# Patient Record
Sex: MALE | Race: WHITE | HISPANIC OR LATINO | URBAN - METROPOLITAN AREA
[De-identification: names, ages, dates, MRNs, and addresses within clinical notes are randomized per-mention and may not be internally consistent; named-entity substitution may affect disease eponyms.]

---

## 2023-09-11 ENCOUNTER — EMERGENCY (EMERGENCY)
Facility: HOSPITAL | Age: 73
LOS: 0 days | Discharge: HOME | End: 2023-09-11
Attending: EMERGENCY MEDICINE
Payer: SELF-PAY

## 2023-09-11 VITALS
DIASTOLIC BLOOD PRESSURE: 87 MMHG | OXYGEN SATURATION: 100 % | TEMPERATURE: 98 F | HEART RATE: 64 BPM | SYSTOLIC BLOOD PRESSURE: 162 MMHG | RESPIRATION RATE: 17 BRPM

## 2023-09-11 VITALS — WEIGHT: 119.93 LBS | HEIGHT: 64 IN

## 2023-09-11 DIAGNOSIS — R07.89 OTHER CHEST PAIN: ICD-10-CM
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
## 2023-09-11 DIAGNOSIS — M54.9 DORSALGIA, UNSPECIFIED: ICD-10-CM

## 2023-09-11 DIAGNOSIS — R10.9 UNSPECIFIED ABDOMINAL PAIN: ICD-10-CM
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
## 2023-09-11 DIAGNOSIS — M54.2 CERVICALGIA: ICD-10-CM

## 2023-09-11 DIAGNOSIS — S42.021A DISPLACED FRACTURE OF SHAFT OF RIGHT CLAVICLE, INITIAL ENCOUNTER FOR CLOSED FRACTURE: ICD-10-CM

## 2023-09-11 DIAGNOSIS — I45.10 UNSPECIFIED RIGHT BUNDLE-BRANCH BLOCK: ICD-10-CM

## 2023-09-11 DIAGNOSIS — R63.0 ANOREXIA: ICD-10-CM

## 2023-09-11 LAB
ALBUMIN SERPL ELPH-MCNC: 3.5 G/DL — SIGNIFICANT CHANGE UP (ref 3.3–5)
ALP SERPL-CCNC: 96 U/L — SIGNIFICANT CHANGE UP (ref 40–120)
ALT FLD-CCNC: 33 U/L — SIGNIFICANT CHANGE UP (ref 12–78)
ANION GAP SERPL CALC-SCNC: 1 MMOL/L — LOW (ref 5–17)
APPEARANCE UR: ABNORMAL
AST SERPL-CCNC: 14 U/L — LOW (ref 15–37)
BACTERIA # UR AUTO: NEGATIVE — SIGNIFICANT CHANGE UP
BASOPHILS # BLD AUTO: 0.03 K/UL — SIGNIFICANT CHANGE UP (ref 0–0.2)
BASOPHILS NFR BLD AUTO: 0.4 % — SIGNIFICANT CHANGE UP (ref 0–2)
BILIRUB SERPL-MCNC: 0.5 MG/DL — SIGNIFICANT CHANGE UP (ref 0.2–1.2)
BILIRUB UR-MCNC: NEGATIVE — SIGNIFICANT CHANGE UP
BUN SERPL-MCNC: 11 MG/DL — SIGNIFICANT CHANGE UP (ref 7–23)
CALCIUM SERPL-MCNC: 10.7 MG/DL — HIGH (ref 8.5–10.1)
CHLORIDE SERPL-SCNC: 104 MMOL/L — SIGNIFICANT CHANGE UP (ref 96–108)
CO2 SERPL-SCNC: 33 MMOL/L — HIGH (ref 22–31)
COLOR SPEC: YELLOW — SIGNIFICANT CHANGE UP
COMMENT - URINE: SIGNIFICANT CHANGE UP
CREAT SERPL-MCNC: 0.93 MG/DL — SIGNIFICANT CHANGE UP (ref 0.5–1.3)
DIFF PNL FLD: NEGATIVE — SIGNIFICANT CHANGE UP
EGFR: 88 ML/MIN/1.73M2 — SIGNIFICANT CHANGE UP
EOSINOPHIL # BLD AUTO: 0.13 K/UL — SIGNIFICANT CHANGE UP (ref 0–0.5)
EOSINOPHIL NFR BLD AUTO: 1.9 % — SIGNIFICANT CHANGE UP (ref 0–6)
EPI CELLS # UR: NEGATIVE — SIGNIFICANT CHANGE UP
GLUCOSE SERPL-MCNC: 125 MG/DL — HIGH (ref 70–99)
GLUCOSE UR QL: NEGATIVE — SIGNIFICANT CHANGE UP
HCT VFR BLD CALC: 42.7 % — SIGNIFICANT CHANGE UP (ref 39–50)
HGB BLD-MCNC: 14.4 G/DL — SIGNIFICANT CHANGE UP (ref 13–17)
IMM GRANULOCYTES NFR BLD AUTO: 0.1 % — SIGNIFICANT CHANGE UP (ref 0–0.9)
KETONES UR-MCNC: NEGATIVE — SIGNIFICANT CHANGE UP
LEUKOCYTE ESTERASE UR-ACNC: NEGATIVE — SIGNIFICANT CHANGE UP
LIDOCAIN IGE QN: 19 U/L — SIGNIFICANT CHANGE UP (ref 13–75)
LYMPHOCYTES # BLD AUTO: 1.43 K/UL — SIGNIFICANT CHANGE UP (ref 1–3.3)
LYMPHOCYTES # BLD AUTO: 21.3 % — SIGNIFICANT CHANGE UP (ref 13–44)
MCHC RBC-ENTMCNC: 29.8 PG — SIGNIFICANT CHANGE UP (ref 27–34)
MCHC RBC-ENTMCNC: 33.7 GM/DL — SIGNIFICANT CHANGE UP (ref 32–36)
MCV RBC AUTO: 88.4 FL — SIGNIFICANT CHANGE UP (ref 80–100)
MONOCYTES # BLD AUTO: 0.48 K/UL — SIGNIFICANT CHANGE UP (ref 0–0.9)
MONOCYTES NFR BLD AUTO: 7.2 % — SIGNIFICANT CHANGE UP (ref 2–14)
NEUTROPHILS # BLD AUTO: 4.62 K/UL — SIGNIFICANT CHANGE UP (ref 1.8–7.4)
NEUTROPHILS NFR BLD AUTO: 69.1 % — SIGNIFICANT CHANGE UP (ref 43–77)
NITRITE UR-MCNC: NEGATIVE — SIGNIFICANT CHANGE UP
PH UR: 8 — SIGNIFICANT CHANGE UP (ref 5–8)
PLATELET # BLD AUTO: 357 K/UL — SIGNIFICANT CHANGE UP (ref 150–400)
POTASSIUM SERPL-MCNC: 3.7 MMOL/L — SIGNIFICANT CHANGE UP (ref 3.5–5.3)
POTASSIUM SERPL-SCNC: 3.7 MMOL/L — SIGNIFICANT CHANGE UP (ref 3.5–5.3)
PROT SERPL-MCNC: 7 GM/DL — SIGNIFICANT CHANGE UP (ref 6–8.3)
PROT UR-MCNC: NEGATIVE — SIGNIFICANT CHANGE UP
RBC # BLD: 4.83 M/UL — SIGNIFICANT CHANGE UP (ref 4.2–5.8)
RBC # FLD: 12.9 % — SIGNIFICANT CHANGE UP (ref 10.3–14.5)
RBC CASTS # UR COMP ASSIST: NEGATIVE /HPF — SIGNIFICANT CHANGE UP (ref 0–4)
SODIUM SERPL-SCNC: 138 MMOL/L — SIGNIFICANT CHANGE UP (ref 135–145)
SP GR SPEC: 1.01 — SIGNIFICANT CHANGE UP (ref 1.01–1.02)
TROPONIN I, HIGH SENSITIVITY RESULT: 6.62 NG/L — SIGNIFICANT CHANGE UP
UROBILINOGEN FLD QL: NEGATIVE — SIGNIFICANT CHANGE UP
WBC # BLD: 6.7 K/UL — SIGNIFICANT CHANGE UP (ref 3.8–10.5)
WBC # FLD AUTO: 6.7 K/UL — SIGNIFICANT CHANGE UP (ref 3.8–10.5)
WBC UR QL: NEGATIVE /HPF — SIGNIFICANT CHANGE UP (ref 0–5)

## 2023-09-11 PROCEDURE — 80053 COMPREHEN METABOLIC PANEL: CPT

## 2023-09-11 PROCEDURE — 93005 ELECTROCARDIOGRAM TRACING: CPT

## 2023-09-11 PROCEDURE — 74177 CT ABD & PELVIS W/CONTRAST: CPT | Mod: MA

## 2023-09-11 PROCEDURE — 96374 THER/PROPH/DIAG INJ IV PUSH: CPT | Mod: XU

## 2023-09-11 PROCEDURE — 87086 URINE CULTURE/COLONY COUNT: CPT

## 2023-09-11 PROCEDURE — 84484 ASSAY OF TROPONIN QUANT: CPT

## 2023-09-11 PROCEDURE — 73030 X-RAY EXAM OF SHOULDER: CPT | Mod: RT

## 2023-09-11 PROCEDURE — 74177 CT ABD & PELVIS W/CONTRAST: CPT | Mod: 26,MA

## 2023-09-11 PROCEDURE — 73030 X-RAY EXAM OF SHOULDER: CPT | Mod: 26,RT

## 2023-09-11 PROCEDURE — 83690 ASSAY OF LIPASE: CPT

## 2023-09-11 PROCEDURE — 99285 EMERGENCY DEPT VISIT HI MDM: CPT | Mod: 25

## 2023-09-11 PROCEDURE — 93010 ELECTROCARDIOGRAM REPORT: CPT

## 2023-09-11 PROCEDURE — 36415 COLL VENOUS BLD VENIPUNCTURE: CPT

## 2023-09-11 PROCEDURE — 99285 EMERGENCY DEPT VISIT HI MDM: CPT

## 2023-09-11 PROCEDURE — 23500 CLTX CLAVICULAR FX W/O MNPJ: CPT | Mod: RT

## 2023-09-11 PROCEDURE — 71260 CT THORAX DX C+: CPT | Mod: MA

## 2023-09-11 PROCEDURE — 85025 COMPLETE CBC W/AUTO DIFF WBC: CPT

## 2023-09-11 PROCEDURE — 73000 X-RAY EXAM OF COLLAR BONE: CPT | Mod: 26,RT

## 2023-09-11 PROCEDURE — 73000 X-RAY EXAM OF COLLAR BONE: CPT | Mod: RT

## 2023-09-11 PROCEDURE — 81001 URINALYSIS AUTO W/SCOPE: CPT

## 2023-09-11 PROCEDURE — 71260 CT THORAX DX C+: CPT | Mod: 26,MA

## 2023-09-11 RX ORDER — LIDOCAINE 4 G/100G
10 CREAM TOPICAL ONCE
Refills: 0 | Status: COMPLETED | OUTPATIENT
Start: 2023-09-11 | End: 2023-09-11

## 2023-09-11 RX ORDER — FAMOTIDINE 10 MG/ML
20 INJECTION INTRAVENOUS ONCE
Refills: 0 | Status: COMPLETED | OUTPATIENT
Start: 2023-09-11 | End: 2023-09-11

## 2023-09-11 RX ADMIN — Medication 30 MILLILITER(S): at 13:13

## 2023-09-11 RX ADMIN — FAMOTIDINE 20 MILLIGRAM(S): 10 INJECTION INTRAVENOUS at 13:13

## 2023-09-11 RX ADMIN — LIDOCAINE 10 MILLILITER(S): 4 CREAM TOPICAL at 13:37

## 2023-09-11 NOTE — CONSULT NOTE ADULT - SUBJECTIVE AND OBJECTIVE BOX
73y Male RHD presents c/o R shoulder pain s/p mechanical fall. MF down several stairs 1 month ago. Patient was not at work. Patient works as day . Patient had concern about obtaining insurance and did not present to doctor's office because of that. Denies numbness, tingling paresthesias in affected extremity. Able to ambulate after fall. Patient endorses not being able to move the shoulder significantly since the injury. Patient has not been able to work since the injury.    PAST MEDICAL & SURGICAL HISTORY:    MEDICATIONS  (STANDING):    Allergies    No Known Allergies    Intolerances                            14.4   6.70  )-----------( 357      ( 11 Sep 2023 09:56 )             42.7     11 Sep 2023 09:56    138    |  104    |  11     ----------------------------<  125    3.7     |  33     |  0.93     Ca    10.7       11 Sep 2023 09:56    TPro  7.0    /  Alb  3.5    /  TBili  0.5    /  DBili  x      /  AST  14     /  ALT  33     /  AlkPhos  96     11 Sep 2023 09:56        Imaging: XR personally reviewed and demonstrates R midshaft Clavicle Fracture, significant displacement    Vital Signs Last 24 Hrs  T(C): 36.7 (09-11-23 @ 09:52), Max: 36.7 (09-11-23 @ 09:52)  T(F): 98.1 (09-11-23 @ 09:52), Max: 98.1 (09-11-23 @ 09:52)  HR: 62 (09-11-23 @ 09:52) (62 - 62)  BP: 123/72 (09-11-23 @ 09:52) (123/72 - 123/72)  BP(mean): 87 (09-11-23 @ 09:52) (87 - 87)  RR: 16 (09-11-23 @ 09:52) (16 - 16)  SpO2: 96% (09-11-23 @ 09:52) (96% - 96%)  Gen: NAD  RUE: Skin intact, -ecchymosis over clavicle, no tenting of the skin, significant deformity noted,+ mild TTP over clavicle, unable to range shoulder 2/2 pain, +ain/pin/m/r/u function, SILT C5-T1, radial pulse intact, compartments soft, brisk cap refill, no bony ttp at elbow/wrist/hand/fingers.    Secondary Survey: Full ROM of unaffected extremities, SILT globally, compartments soft, no bony TTP over bony prominences, no calf TTP, able to SLR with contralateral leg, no TTP along axial spine.    A/P: 73y Male with R midshaft clavicle fracture, injury 1 month ago  -Pain control  -NWB RUE in sling  -Instructed patient to do passive range of motion exercises and assisted passive range of motion with contralateral arm  -Ice  -Active movement of fingers/wrist/elbow encouraged  -Follow up with Dr. Carpio within 1-2 weeks, call office for appointment  -Ortho stable, no surgical intervention at this time

## 2023-09-11 NOTE — ED PROVIDER NOTE - NSFOLLOWUPINSTRUCTIONS_ED_ALL_ED_FT
Please call and follow up with orthopedics - Dr. Carpio in 1 week.    Use Tylenol (acetaminophen) 1000mg every 6 hours  as need for pain.     Return to the Emergency Department for worsening or persistent symptoms, and/or ANY NEW OR CONCERNING SYMPTOMS. If you have issues obtaining follow up, please call: [phone] ([phone] or [phone]  to obtain a doctor or specialist who takes your insurance in your area.      Llame y deon un seguimiento con ortopedia: Dr. Carpio en 1 semana.    Use Tylenol (acetaminofén) 1000 mg cada 6 horas según sea necesario para el dolor.    Regrese al Departamento de Emergencias si los síntomas empeoran o persisten, y/o CUALQUIER SÍNTOMA NUEVO O PREOCUPANTE. Si tiene problemas para obtener un seguimiento, llame al: [phone] ([phone] o al [phone] para obtener un médico o especialista que acepte lui seguro en lui área.        Fractura de clavícula    LO QUE NECESITA SABER:    Néstor fractura de clavícula es néstor fisura o quebradura de la clavícula.  Anatomía del hombro    INSTRUCCIONES SOBRE EL BRADEN HOSPITALARIA:    Busque atención médica de inmediato si:    El hombro, brazo, mano o dedos de las ananda se vuelven de color hayder o pálidos, o se sienten fríos o adormecidos.    El dolor empeora aun después de descansar y monica medicamentos.    Siente apretada la férula o tiene más inflamación.    No puede  cathy dedos.  Llame a lui médico si:    Lui cabestrillo o envoltura se sale o se daña.    Usted tiene preguntas o inquietudes acerca de lui condición o cuidado.  Medicamentos:Es posible que usted necesite alguno de los siguientes:    Acetaminofénalivia el dolor y baja la fiebre. Está disponible sin receta médica. Pregunte la cantidad y la frecuencia con que debe tomarlos. Siga las indicaciones. Leroy las etiquetas de todos los demás medicamentos que esté usando para saber si también contienen acetaminofén, o pregunte a lui médico o farmacéutico. El acetaminofén puede causar daño en el hígado cuando no se irena de forma correcta.    AINEcomo el ibuprofeno, ayudan a disminuir la inflamación, el dolor y la fiebre. Lisa medicamento está disponible con o sin néstor receta médica. Los ROMI pueden causar sangrado estomacal o problemas renales en ciertas personas. Si usted irena un medicamento anticoagulante, siempre pregúntele a lui médico si los ROMI son seguros para usted. Siempre leroy la etiqueta de lisa medicamento y siga las instrucciones.    [place] cathy medicamentos michelle se le haya indicado.Consulte con lui médico si usted simeon que lui medicamento no le está ayudando o si presenta efectos secundarios. Infórmele al médico si usted es alérgico a algún medicamento. Mantenga néstor lista actualizada de los medicamentos, las vitaminas y los productos herbales que irena. Incluya los siguientes datos de los medicamentos: cantidad, frecuencia y motivo de administración. Traiga con usted la lista o los envases de las píldoras a cathy citas de seguimiento. Lleve la lista de los medicamentos con usted en tj de néstor emergencia.  Férula o abrazadera:Le colocarán un cabestrillo o néstor abrazadera para inmovilizarle la clavícula mientras mary. Consulte con lui médico para obtener más información sobre cómo cuidar el cabestrillo o la abrazadera, incluido cómo ajustarlos.  Cabestrillo para hombro    Aplique hielo:Aplique hielo en lui clavícula marilyn 15 a 20 minutos cada hora o según se le indique. Use néstor compresa de hielo o ponga hielo triturado en néstor bolsa de plástico. Cubra la bolsa con néstor toalla antes de aplicarla en la clavícula. El hielo disminuye la inflamación y el dolor.    Actividad:Limite la actividad michelle se lo indique lui médico. Comience lentamente a hacer más actividades cada día conforme se sienta mejor.    La fisioterapiaLa fisioterapia podría recomendarse después de que la clavícula sane. Un fisioterapeuta le puede enseñar ejercicios para ayudarle a mejorar el movimiento y la fuerza, y para disminuir el dolor.    Acuda a cathy consultas de control con lui médico dentro de 1 semana o según le indicaron.Es posible que tenga que regresar a que le tomen más radiografías para katia que lane pop está sanando lui clavícula. Anote cathy preguntas para que se acuerde de hacerlas marilyn cathy visitas.      Dolor abdominal    LO QUE NECESITA SABER:    El dolor abdominal puede ser sordo, molesto, o laxmi. Usted puede sentir dolor localizado en néstor tanika área del abdomen o en todo el abdomen. El dolor puede ser causado por néstor afección michelle estreñimiento, sensibilidad o intoxicación alimentaria, infección o néstor obstrucción. Asimismo, el dolor abdominal puede deberse a néstor hernia, apendicitis o néstor úlcera. Las enfermedades del hígado, la vesícula o el riñón también pueden causar dolor abdominal. Es posible que se desconozca la causa del dolor abdominal.  Órganos abdominales    INSTRUCCIONES SOBRE EL BRADEN HOSPITALARIA:    Llame al número de emergencias local (911 en [place]) si:    Usted tiene dolor en el pecho o falta de aire.    Regrese a la sabrina de emergencias si:    Usted siente un dolor con pulsaciones en la parte superior del abdomen o en la parte inferior de la espalda que de repente se vuelve eligio.    El dolor se localiza en la parte inferior derecha del abdomen y empeora cuando se mueve.    Usted tiene fiebre por encima de los 100.4 °F (38 °C) o escalofríos.    Usted tiene vómitos y no puede retener líquidos ni alimentos en el estómago.    El dolor no mejora o empeora en las próximas 8 a 12 horas.    Usted nota jeet en lui vómito o heces, o éstas tienen un aspecto negruzco y alquitranado.    Lui piel o las partes herson de cathy ojos se vuelven amarillentas.    Si usted es néstor jossy y presenta abundante sangrado vaginal que no es lui menstruación.  Llame a lui médico si:    Usted siente dolor en la parte inferior de la espalda.    Usted es varón y tiene dolor en los testículos.    Siente dolor al orinar.    Usted tiene preguntas o inquietudes acerca de lui condición o cuidado.  Medicamentos:Es posible que usted necesite alguno de los siguientes:    Los medicamentospueden administrarse para calmar lui estómago o prevenir los vómitos.    Puede administrarsepodrían administrarse. Pregunte al médico cómo debe monica lisa medicamento de forma murdock. Algunos medicamentos recetados para el dolor contienen acetaminofén. No tome otros medicamentos que contengan acetaminofén sin consultarlo con lui médico. Demasiado acetaminofeno puede causar daño al hígado. Los medicamentos recetados para el dolor podrían causar estreñimiento. Pregunte a lui médico michelle prevenir o tratar estreñimiento.    [place] cathy medicamentos michelle se le haya indicado.Consulte con lui médico si usted simeon que lui medicamento no le está ayudando o si presenta efectos secundarios. Infórmele al médico si usted es alérgico a algún medicamento. Mantenga néstor lista actualizada de los medicamentos, las vitaminas y los productos herbales que irena. Incluya los siguientes datos de los medicamentos: cantidad, frecuencia y motivo de administración. Traiga con usted la lista o los envases de las píldoras a cathy citas de seguimiento. Lleve la lista de los medicamentos con usted en tj de néstor emergencia.  Controle o evite el dolor abdominal:    Aplique calorsobre el abdomen de 20 a 30 minutos cada 2 horas por los días que le indiquen. El calor ayuda a disminuir el dolor y los espasmos musculares.    Realice cambios en los alimentos que consuma, de ser necesario.No coma alimentos que causan dolor abdominal u otros síntomas. Ingiera comidas pequeñas, más a menudo. Los siguientes cambios también pueden ayudar:  Coma más alimentos ricos en fibra si tiene estreñimiento.Los alimentos altos en fibra incluyen frutas, verduras, alimentos de grano integral y legumbres, michelle frijoles pintos.        No coma alimentos que causan gas si tiene distensión.Por ejemplo, brócoli, repollo, frijoles y bebidas carbonatadas.    No consuma alimentos o bebidas que contienen sorbitol o fructosa si tiene diarrea y distensión.Algunos ejemplos son jugos de frutas, dulces, mermeladas y gomas de mascar sin azúcar.    No consuma alimentos altos en grasa.Por ejemplo, comidas fritas, hamburguesas con queso, perros calientes y postres.    Realice cambios en los líquidos que tome, de ser necesario.No tome líquidos que le causen dolor o lo empeoren, michelle el jugo de naranja. [place] líquidos marilyn el día para mantenerse hidratado. Los siguientes cambios también pueden ayudar:  Kymberly suficientes líquidos para evitar la deshidratación causada por la diarrea o los vómitos.Pregunte a lui médico sobre la cantidad de líquido que necesita monica todos los teresa y cuáles le recomienda.    Limite o no tome cafeína.La cafeína puede empeorar los síntomas, michelle la acidez o las náuseas.    Limite o no consuma bebidas alcohólicas.El alcohol puede empeorar el dolor abdominal. Pregúntele a lui médico si está pop que usted consuma alcohol. Pregunte qué cantidad puede beber. Néstor bebida de alcohol equivale a 12 onzas de cerveza, ½ onza de licor o 5 onzas de vino.    Lleve un registro diario del dolor abdominal.Un diario puede ayudar a lui médico a saber lo que está causando lui dolor. Incluya cuándo ocurre el dolor, cuánto dura y la sensación causada por el dolor. Anote cualquier otro síntoma que tenga además del dolor abdominal. También anote lo que coma y cualquier síntoma que tenga después de comer.    Controle el estrés.El estrés puede causar dolor abdominal. Lui médico puede recomendarle técnicas de relajación y ejercicios de respiración profunda para ayudar a disminuir el estrés. Lui médico puede recomendarle que hable con alguien sobre lui estrés o ansiedad, michelle un consejero o un amigo. [place] lo suficiente. Realice actividad física con regularidad.  JERMAINE ASIÁTICA CAMINANDO MICHELLE EJERCICIO      No fume.La nicotina y otros químicos en los cigarrillos pueden dañarle el esófago y el estómago. Pida información a lui médico si usted actualmente fuma y necesita ayuda para dejar de fumar. Los cigarrillos electrónicos o el tabaco sin humo igualmente contienen nicotina. Consulte con lui médico antes de utilizar estos productos.  Acuda a la consulta de control con lui médico según las indicaciones:Anote cathy preguntas para que se acuerde de hacerlas marilyn cathy visitas. Please call and follow up with orthopedics - Dr. Carpio in 1 week.    Use Tylenol (acetaminophen) 1000mg every 6 hours  as need for pain.     Return to the Emergency Department for worsening or persistent symptoms, and/or ANY NEW OR CONCERNING SYMPTOMS. If you have issues obtaining follow up, please call: [phone] ([phone]) or [phone]  to obtain a doctor or specialist who takes your insurance in your area.      Llame y deon un seguimiento con ortopedia: Dr. Carpio en 1 semana.    Zen Carpio.  Orthopaedic Surgery  [address] [place] [address]  Phone: [phone]  Fax: [phone]    Use Tylenol (acetaminofén) 1000 mg cada 6 horas según sea necesario para el dolor.    [place] pepcid 20 mg al día para ayudar con el dolor de estómago.    Regrese al Departamento de Emergencias si los síntomas empeoran o persisten, y/o CUALQUIER SÍNTOMA NUEVO O PREOCUPANTE. Si tiene problemas para obtener un seguimiento, llame al: [phone] ([phone]) o al [phone] para obtener un médico o especialista que acepte lui seguro en lui área.        Fractura de clavícula    LO QUE NECESITA SABER:    Néstor fractura de clavícula es néstor fisura o quebradura de la clavícula.  Anatomía del hombro    INSTRUCCIONES SOBRE EL BRADEN HOSPITALARIA:    Busque atención médica de inmediato si:    El hombro, brazo, mano o dedos de las ananda se vuelven de color hayder o pálidos, o se sienten fríos o adormecidos.    El dolor empeora aun después de descansar y monica medicamentos.    Siente apretada la férula o tiene más inflamación.    No puede  cathy dedos.  Llame a lui médico si:    Lui cabestrillo o envoltura se sale o se daña.    Usted tiene preguntas o inquietudes acerca de lui condición o cuidado.  Medicamentos:Es posible que usted necesite alguno de los siguientes:    Acetaminofénalivia el dolor y baja la fiebre. Está disponible sin receta médica. Pregunte la cantidad y la frecuencia con que debe tomarlos. Siga las indicaciones. Leroy las etiquetas de todos los demás medicamentos que esté usando para saber si también contienen acetaminofén, o pregunte a lui médico o farmacéutico. El acetaminofén puede causar daño en el hígado cuando no se irena de forma correcta.    AINEcomo el ibuprofeno, ayudan a disminuir la inflamación, el dolor y la fiebre. Lisa medicamento está disponible con o sin néstor receta médica. Los ROMI pueden causar sangrado estomacal o problemas renales en ciertas personas. Si usted irena un medicamento anticoagulante, siempre pregúntele a lui médico si los ROMI son seguros para usted. Siempre leroy la etiqueta de lisa medicamento y siga las instrucciones.    [place] cathy medicamentos michelle se le haya indicado.Consulte con lui médico si usted ismeon que lui medicamento no le está ayudando o si presenta efectos secundarios. Infórmele al médico si usted es alérgico a algún medicamento. Mantenga néstor lista actualizada de los medicamentos, las vitaminas y los productos herbales que irena. Incluya los siguientes datos de los medicamentos: cantidad, frecuencia y motivo de administración. Traiga con usted la lista o los envases de las píldoras a cathy citas de seguimiento. Lleve la lista de los medicamentos con usted en tj de néstor emergencia.  Férula o abrazadera:Le colocarán un cabestrillo o néstor abrazadera para inmovilizarle la clavícula mientras mary. Consulte con lui médico para obtener más información sobre cómo cuidar el cabestrillo o la abrazadera, incluido cómo ajustarlos.  Cabestrillo para hombro    Aplique hielo:Aplique hielo en lui clavícula marilyn 15 a 20 minutos cada hora o según se le indique. Use néstor compresa de hielo o ponga hielo triturado en néstor bolsa de plástico. Cubra la bolsa con néstor toalla antes de aplicarla en la clavícula. El hielo disminuye la inflamación y el dolor.    Actividad:Limite la actividad michelle se lo indique lui médico. Comience lentamente a hacer más actividades cada día conforme se sienta mejor.    La fisioterapiaLa fisioterapia podría recomendarse después de que la clavícula sane. Un fisioterapeuta le puede enseñar ejercicios para ayudarle a mejorar el movimiento y la fuerza, y para disminuir el dolor.    Acuda a cathy consultas de control con lui médico dentro de 1 semana o según le indicaron.Es posible que tenga que regresar a que le tomen más radiografías para katia que lane pop está sanando lui clavícula. Anote cathy preguntas para que se acuerde de hacerlas marilyn cathy visitas.      Dolor abdominal    LO QUE NECESITA SABER:    El dolor abdominal puede ser sordo, molesto, o laxmi. Usted puede sentir dolor localizado en néstor tanika área del abdomen o en todo el abdomen. El dolor puede ser causado por néstor afección michelle estreñimiento, sensibilidad o intoxicación alimentaria, infección o néstor obstrucción. Asimismo, el dolor abdominal puede deberse a néstor hernia, apendicitis o néstor úlcera. Las enfermedades del hígado, la vesícula o el riñón también pueden causar dolor abdominal. Es posible que se desconozca la causa del dolor abdominal.  Órganos abdominales    INSTRUCCIONES SOBRE EL BRADEN HOSPITALARIA:    Llame al número de emergencias local (911 en [place]) si:    Usted tiene dolor en el pecho o falta de aire.    Regrese a la sabrina de emergencias si:    Usted siente un dolor con pulsaciones en la parte superior del abdomen o en la parte inferior de la espalda que de repente se vuelve eligio.    El dolor se localiza en la parte inferior derecha del abdomen y empeora cuando se mueve.    Usted tiene fiebre por encima de los 100.4 °F (38 °C) o escalofríos.    Usted tiene vómitos y no puede retener líquidos ni alimentos en el estómago.    El dolor no mejora o empeora en las próximas 8 a 12 horas.    Usted nota jeet en lui vómito o heces, o éstas tienen un aspecto negruzco y alquitranado.    Lui piel o las partes herson de cathy ojos se vuelven amarillentas.    Si usted es néstor jossy y presenta abundante sangrado vaginal que no es lui menstruación.  Llame a lui médico si:    Usted siente dolor en la parte inferior de la espalda.    Usted es varón y tiene dolor en los testículos.    Siente dolor al orinar.    Usted tiene preguntas o inquietudes acerca de lui condición o cuidado.  Medicamentos:Es posible que usted necesite alguno de los siguientes:    Los medicamentospueden administrarse para calmar lui estómago o prevenir los vómitos.    Puede administrarsepodrían administrarse. Pregunte al médico cómo debe monica lisa medicamento de forma murdock. Algunos medicamentos recetados para el dolor contienen acetaminofén. No tome otros medicamentos que contengan acetaminofén sin consultarlo con lui médico. Demasiado acetaminofeno puede causar daño al hígado. Los medicamentos recetados para el dolor podrían causar estreñimiento. Pregunte a lui médico michelle prevenir o tratar estreñimiento.    [place] cathy medicamentos michelle se le haya indicado.Consulte con lui médico si usted simeon que lui medicamento no le está ayudando o si presenta efectos secundarios. Infórmele al médico si usted es alérgico a algún medicamento. Mantenga néstor lista actualizada de los medicamentos, las vitaminas y los productos herbales que irena. Incluya los siguientes datos de los medicamentos: cantidad, frecuencia y motivo de administración. Traiga con usted la lista o los envases de las píldoras a cathy citas de seguimiento. Lleve la lista de los medicamentos con usted en tj de néstor emergencia.  Controle o evite el dolor abdominal:    Aplique calorsobre el abdomen de 20 a 30 minutos cada 2 horas por los días que le indiquen. El calor ayuda a disminuir el dolor y los espasmos musculares.    Realice cambios en los alimentos que consuma, de ser necesario.No coma alimentos que causan dolor abdominal u otros síntomas. Ingiera comidas pequeñas, más a menudo. Los siguientes cambios también pueden ayudar:  Coma más alimentos ricos en fibra si tiene estreñimiento.Los alimentos altos en fibra incluyen frutas, verduras, alimentos de grano integral y legumbres, michelle frijoles pintos.        No coma alimentos que causan gas si tiene distensión.Por ejemplo, brócoli, repollo, frijoles y bebidas carbonatadas.    No consuma alimentos o bebidas que contienen sorbitol o fructosa si tiene diarrea y distensión.Algunos ejemplos son jugos de frutas, dulces, mermeladas y gomas de mascar sin azúcar.    No consuma alimentos altos en grasa.Por ejemplo, comidas fritas, hamburguesas con queso, perros calientes y postres.    Realice cambios en los líquidos que tome, de ser necesario.No tome líquidos que le causen dolor o lo empeoren, michelle el jugo de naranja. [place] líquidos marilyn el día para mantenerse hidratado. Los siguientes cambios también pueden ayudar:  [place] suficientes líquidos para evitar la deshidratación causada por la diarrea o los vómitos.Pregunte a lui médico sobre la cantidad de líquido que necesita monica todos los teresa y cuáles le recomienda.    Limite o no tome cafeína.La cafeína puede empeorar los síntomas, michelle la acidez o las náuseas.    Limite o no consuma bebidas alcohólicas.El alcohol puede empeorar el dolor abdominal. Pregúntele a lui médico si está pop que usted consuma alcohol. Pregunte qué cantidad puede beber. Néstor bebida de alcohol equivale a 12 onzas de cerveza, ½ onza de licor o 5 onzas de vino.    Lleve un registro diario del dolor abdominal.Un diario puede ayudar a lui médico a saber lo que está causando lui dolor. Incluya cuándo ocurre el dolor, cuánto dura y la sensación causada por el dolor. Anote cualquier otro síntoma que tenga además del dolor abdominal. También anote lo que coma y cualquier síntoma que tenga después de comer.    Controle el estrés.El estrés puede causar dolor abdominal. Lui médico puede recomendarle técnicas de relajación y ejercicios de respiración profunda para ayudar a disminuir el estrés. Lui médico puede recomendarle que hable con alguien sobre lui estrés o ansiedad, michelle un consejero o un amigo. [place] lo suficiente. Realice actividad física con regularidad.  JERMAINE ASIÁTICA CAMINANDO MICHELLE EJERCICIO      No fume.La nicotina y otros químicos en los cigarrillos pueden dañarle el esófago y el estómago. Pida información a lui médico si usted actualmente fuma y necesita ayuda para dejar de fumar. Los cigarrillos electrónicos o el tabaco sin humo igualmente contienen nicotina. Consulte con lui médico antes de utilizar estos productos.  Acuda a la consulta de control con lui médico según las indicaciones:Anote cathy preguntas para que se acuerde de hacerlas marilyn cathy visitas.

## 2023-09-11 NOTE — ED ADULT NURSE NOTE - NSFALLUNIVINTERV_ED_ALL_ED
Bed/Stretcher in lowest position, wheels locked, appropriate side rails in place/Call bell, personal items and telephone in reach/Instruct patient to call for assistance before getting out of bed/chair/stretcher/Non-slip footwear applied when patient is off stretcher/[place] to call system/Physically safe environment - no spills, clutter or unnecessary equipment/Purposeful proactive rounding/Room/bathroom lighting operational, light cord in reach

## 2023-09-11 NOTE — ED PROVIDER NOTE - MUSCULOSKELETAL, MLM
Spine appears normal, range of motion is not limited, no muscle or joint tenderness, no signs of trauma on scalp, not ttp midline spine, deformity to right clavicle Spine appears normal, range of motion is not limited, no muscle or joint tenderness, no signs of trauma on scalp, not ttp midline spine, deformity to right clavicle with tenting of the skin

## 2023-09-11 NOTE — ED PROVIDER NOTE - OBJECTIVE STATEMENT
72 yo male with no pertinent PMHx presents to the ED multiple medical complaints. Pt reports he fell down 10 wooden steps 1 month ago and since then has been having right sided shoulder, neck, and back pain. States he tripped and fell down the stairs while holding a suitcase. Pt reports a decrease in PO intake. Pt states after he eats, he becomes nauseous. Pt reports he is only able to drink water and juice. Pt also c/o chest pressure. Pt denies any fevers, SOB, or dizziness.  used, id# [id number]

## 2023-09-11 NOTE — ED PROVIDER NOTE - WET READ LAUNCH FT
There are no Wet Read(s) to document. Consent (Scalp)/Introductory Paragraph: The rationale for Mohs was explained to the patient and consent was obtained. The risks, benefits and alternatives to therapy were discussed in detail. Specifically, the risks of changes in hair growth pattern secondary to repair, infection, scarring, bleeding, prolonged wound healing, incomplete removal, allergy to anesthesia, nerve injury and recurrence were addressed. Prior to the procedure, the treatment site was clearly identified and confirmed by the patient. All components of Universal Protocol/PAUSE Rule completed.

## 2023-09-11 NOTE — ED PROVIDER NOTE - CLINICAL SUMMARY MEDICAL DECISION MAKING FREE TEXT BOX
72 yo pt s/p fall 1 month ago with deformity to right clavicle presents with inability to reduce fx site of right clavicle. Pt with a decreased PO intake. Plan check labs x-ray ct scan.

## 2023-09-11 NOTE — ED PROVIDER NOTE - CARE PROVIDER_API CALL
Zen Carpio.  Orthopaedic Surgery  [address] [place] [address]  Phone: [phone]  Fax: [phone]  Follow Up Time:

## 2023-09-11 NOTE — ED ADULT NURSE NOTE - OBJECTIVE STATEMENT
Pt in ED c/o pain R shoulder, neck and back x 1 month.  Decreased Po intake, c/o of nausea..  Pt breathing symmetrical and unlabored, cardiac monitoring in place, #20 IV inserted into LAC, bloods drawn and sent to lab.  Pt in no acute distress at this time.

## 2023-09-11 NOTE — ED ADULT TRIAGE NOTE - CHIEF COMPLAINT QUOTE
Pt presented to the ER with multiple medical complaints. Pt stated that for the past month he has been having pain on his right side including his right shoulder, neck, and back. Pt also reported a decrease PO intake due to when he eats he feels nauseous and feel like he is going to vomit. Pt reported he can only drink water or juice. Pt also reported c/o chest pressure. Pt denies any fevers, SOB, or dizziness.

## 2023-09-11 NOTE — ED PROVIDER NOTE - PATIENT PORTAL LINK FT
You can access the FollowMyHealth Patient Portal offered by [facility] by registering at the following website: [email or web address]. By joining [email or web address]’s FollowMyHealth portal, you will also be able to view your health information using other applications (apps) compatible with our system.

## 2023-09-12 LAB
CULTURE RESULTS: SIGNIFICANT CHANGE UP
SPECIMEN SOURCE: SIGNIFICANT CHANGE UP

## 2023-11-01 ENCOUNTER — APPOINTMENT (OUTPATIENT)
Dept: CARDIOLOGY | Facility: HOSPITAL | Age: 73
End: 2023-11-01

## 2023-11-01 PROBLEM — Z00.00 ENCOUNTER FOR PREVENTIVE HEALTH EXAMINATION: Status: ACTIVE | Noted: 2023-11-01

## 2023-11-13 ENCOUNTER — APPOINTMENT (OUTPATIENT)
Dept: UROLOGY | Facility: HOSPITAL | Age: 73
End: 2023-11-13

## 2023-12-06 ENCOUNTER — APPOINTMENT (OUTPATIENT)
Dept: ORTHOPEDIC SURGERY | Facility: HOSPITAL | Age: 73
End: 2023-12-06

## 2023-12-22 ENCOUNTER — OUTPATIENT (OUTPATIENT)
Dept: OUTPATIENT SERVICES | Facility: HOSPITAL | Age: 73
LOS: 1 days | End: 2023-12-22
Payer: COMMERCIAL

## 2023-12-22 ENCOUNTER — APPOINTMENT (OUTPATIENT)
Dept: RADIOLOGY | Facility: CLINIC | Age: 73
End: 2023-12-22
Payer: COMMERCIAL

## 2023-12-22 DIAGNOSIS — M25.551 PAIN IN RIGHT HIP: ICD-10-CM

## 2023-12-22 PROCEDURE — 73130 X-RAY EXAM OF HAND: CPT

## 2023-12-22 PROCEDURE — 73562 X-RAY EXAM OF KNEE 3: CPT

## 2023-12-22 PROCEDURE — 73562 X-RAY EXAM OF KNEE 3: CPT | Mod: 26,RT

## 2023-12-22 PROCEDURE — 73130 X-RAY EXAM OF HAND: CPT | Mod: 26,RT

## 2025-09-17 ENCOUNTER — TRANSCRIPTION ENCOUNTER (OUTPATIENT)
Age: 75
End: 2025-09-17

## 2025-09-17 ENCOUNTER — NON-APPOINTMENT (OUTPATIENT)
Age: 75
End: 2025-09-17